# Patient Record
Sex: FEMALE | NOT HISPANIC OR LATINO | Employment: PART TIME | URBAN - METROPOLITAN AREA
[De-identification: names, ages, dates, MRNs, and addresses within clinical notes are randomized per-mention and may not be internally consistent; named-entity substitution may affect disease eponyms.]

---

## 2022-03-24 PROBLEM — G43.919 INTRACTABLE MIGRAINE WITHOUT STATUS MIGRAINOSUS: Status: ACTIVE | Noted: 2022-03-24

## 2022-05-19 ENCOUNTER — TELEPHONE (OUTPATIENT)
Dept: NEUROLOGY | Facility: MEDICAL CENTER | Age: 42
End: 2022-05-19

## 2022-05-19 ENCOUNTER — OFFICE VISIT (OUTPATIENT)
Dept: NEUROLOGY | Facility: MEDICAL CENTER | Age: 42
End: 2022-05-19
Attending: NURSE PRACTITIONER
Payer: COMMERCIAL

## 2022-05-19 VITALS
DIASTOLIC BLOOD PRESSURE: 68 MMHG | OXYGEN SATURATION: 97 % | TEMPERATURE: 97.7 F | HEIGHT: 63 IN | HEART RATE: 71 BPM | BODY MASS INDEX: 24.41 KG/M2 | SYSTOLIC BLOOD PRESSURE: 106 MMHG | WEIGHT: 137.8 LBS

## 2022-05-19 DIAGNOSIS — G43.919 INTRACTABLE MIGRAINE WITHOUT STATUS MIGRAINOSUS, UNSPECIFIED MIGRAINE TYPE: ICD-10-CM

## 2022-05-19 DIAGNOSIS — G43.109 COMPLICATED MIGRAINE: ICD-10-CM

## 2022-05-19 PROCEDURE — 99211 OFF/OP EST MAY X REQ PHY/QHP: CPT | Performed by: NURSE PRACTITIONER

## 2022-05-19 PROCEDURE — 99203 OFFICE O/P NEW LOW 30 MIN: CPT | Performed by: NURSE PRACTITIONER

## 2022-05-19 RX ORDER — DIVALPROEX SODIUM 500 MG/1
500 TABLET, DELAYED RELEASE ORAL
Qty: 30 TABLET | Refills: 2 | Status: SHIPPED | OUTPATIENT
Start: 2022-05-19 | End: 2022-08-31

## 2022-05-19 RX ORDER — ACETAMINOPHEN AND CODEINE PHOSPHATE 120; 12 MG/5ML; MG/5ML
1 SOLUTION ORAL
COMMUNITY
Start: 2022-03-06

## 2022-05-19 ASSESSMENT — PATIENT HEALTH QUESTIONNAIRE - PHQ9: CLINICAL INTERPRETATION OF PHQ2 SCORE: 0

## 2022-05-19 ASSESSMENT — ENCOUNTER SYMPTOMS
DEPRESSION: 1
SENSORY CHANGE: 0
DIZZINESS: 1
CONSTIPATION: 1
HEADACHES: 1
DIARRHEA: 0
NERVOUS/ANXIOUS: 1
TINGLING: 1
SINUS PAIN: 1
NECK PAIN: 1
PHOTOPHOBIA: 1
COUGH: 0

## 2022-05-19 ASSESSMENT — FIBROSIS 4 INDEX: FIB4 SCORE: 0.69

## 2022-05-19 NOTE — PATIENT INSTRUCTIONS
For prevention:   Ajovy 225mg subcutaneous injection every 4 weeks, this will first go through prior authorization department, you can find a copay card for this online.   Take out of the refrigerator AT LEAST 30 minutes prior to injection.   It will take 4-6 months to get the full effect of  this medication    For rescue:    Nurtec 75mg dissolvable tablet 1 up to once daily, up to 8 times per month, this too will go through prior authorization department, there is also a coupon card online.       I recommend the following over the counter supplements every night at bedtime:  Start magnesium oxide 400mg gel cap by mouth every night, may take extra dose if needed for headache (over the counter), hold for diarrhea         Start Riboflavin (Vitamin B2) 400mg by mouth every night (over the counter),may turn urine bright yellow         Start COQ 10, take 300mg every night. (over the counter)          Attempt to go to bed and get up at the same time every night           Eat meals on regular basis            Stay hydrated.             Aerobic exercise 30 minutes daily             Avoid aged or smoked foods, avoid processed foods, red wine, aged cheese              Keep headache diary, include foods that you may have eaten.             Avoid overusing over the counter medications:  Do not take more than 500mg acetaminophen (tylenol), more than 4 times weekly, more frequent or larger doses are associated with medication overuse headache.

## 2022-05-19 NOTE — TELEPHONE ENCOUNTER
Ajovy 225mg/1.5ml SOAJ    PA submitted via CM Key: [id number]  awaiting response TAT 24-72 hrs. - 05/19/2022 12:59pm

## 2022-05-19 NOTE — PROGRESS NOTES
Subjective   HPI  Rula Carbone is a 41 y.o.right handed female who presents with chronic migraine.    She was referred by PATEL Frost.    She has moved several times due to her work.  She is currently on Depakote, she is taking 1/2 of the 500mg Depakote daily, she is now getting a weird sensation in the top of her head, it is in one spot at the posterior crown of the head.  She takes a nap every afternoon, when she wakes up her hands are clenched.     Her very first migraine in her life was an ocular migraine at age 28.    She had been migraine free or some time and in July 2020, she got a migraine again that would not go away for 3 months, she was falling, she had weakness on the left side, she was exhausted, she was having difficulty with getting her words mixed up and she had vertigo.  She still gets this about twice per year, it slowly builds up for a week and then slowly ebbs for about 1 week.    Has had MRI In the past it is normal         Age at Onset:  28  Triggers: sunlight  Alleviating factors:  Laying down  Meds tried and result:         Preventative:  Medication Dose/length of treatment Result/side effects   Depakote 250-500mg helps   Topamax  Didn't work                                              Abortive:   Medication Dose/length of treatment Result/side effects   Steroids  Didn't help   Trigger point injections  Only helped for  Few days   Botox   9 months Helped, but then she moved   Rizatriptan  Didn't help   sumatriptan     naratriptan  Didn't work                    Hormones:  Oral birth control (she has had hysterectomy but is on OCP for ovarian cyst)  Caffeine use: drinks coffee all day  OTC medications--frequency: 1 tylenol or 1 ibuprofen 2-3 times per week  How many days per month:  30/30   Missed days of school/work in past 6 months:  2   Characteristics:                   A) Location:  Band above eyes and in the back of her head             B)Duration:  Can last up to 2 weeks.            "  C)Intensity:  5-6/10, without Depakote 8/10               D) Quality of pain:  Pressure,             N&V:  nausea  Photo/phonophobia:  Light   Aura: none   Prodrome:none  ER/Urgent care visits in past 6 months:  none  Sleep schedule:  Routine schedule, gets about 7 hours of sleep   Associated Symptoms:  Weakness, speech deficits, vertigo, exhaustion.             PMH: asthma, migraine, glaucoma.     Social Hx denies smoking or drug use, occasional glass of wine, she is a -she works part time.    Family Hx: Father: cancer HTN, Sister: ovarian cancer.    Review of Systems   HENT: Positive for congestion and sinus pain.    Eyes: Positive for photophobia.   Respiratory: Negative for cough.    Cardiovascular: Negative for chest pain.   Gastrointestinal: Positive for constipation. Negative for diarrhea.   Musculoskeletal: Positive for neck pain.   Neurological: Positive for dizziness, tingling and headaches. Negative for sensory change.   Psychiatric/Behavioral: Positive for depression. The patient is nervous/anxious.        Objective      Encounter Vitals  Standard Vitals  Vitals  Blood Pressure: 106/68  Temperature: 36.5 °C (97.7 °F)  Temp src: Temporal  Pulse: 71  Pulse Oximetry: 97 %  Height: 160 cm (5' 3\")  Weight: 62.5 kg (137 lb 12.8 oz)  Encounter Vitals  Temperature: 36.5 °C (97.7 °F)  Temp src: Temporal  Blood Pressure: 106/68  Pulse: 71  Pulse Oximetry: 97 %  Weight: 62.5 kg (137 lb 12.8 oz)  Height: 160 cm (5' 3\")  BMI (Calculated): 24.41    PHYSICAL ASSESSMENT  Constitutional:  Alert, no apparent distress,  Psych:   mood and affect WNL  Muskuloskeletal:  Moves all extremities equally, strength 5/5  BUE/BLE flexors/extensors, no drift  NEUROLOGICAL ASSESSMENT  Oriented X 4, speech fluent, naming and memory intact  CN II: Visual fields are full to confrontation. Fundoscopic exam is normal with sharp discs and no vascular changes. Pupils are 3 mm and briskly reactive to light.   CN III: IV, VI  EOMs " intact, no ptosis  CN V: Facial sensation is intact to pinprick in all 3 divisions bilaterally. Corneal responses are intact.  CN VII: Face is symmetric with normal eye closure and smile.  CN VIII Hearing is normal to rubbing fingers  CN IX, X: Palate elevates symmetrically. Phonation is normal.  CN XI: Head turning and shoulder shrug are intact  CN XII: Tongue is midline with normal movements and no atrophy.                           Sensation to PP equal bilaterally                 No limb ataxia with finger to nose and heel to shin                 Ambulates with steady gait.                 Rhomberg negative                Biceps,brachioradialis, tricep, and patellar reflexes all 2+     Cardiovascular:    S1S2, no abnormal rhythm auscultated, no peripheral edema  Neck:                     No carotid bruits noted   Pulmonary:            Respirations easy, lungs clear to auscultation all fields.     Skin:                     No obvious rashes.        Assessment & Plan      1. Complicated migraine    Continue Depakote 500mg daily    Ajovy 225mg subcutaneous injection every 4 weeks, this will first go through prior authorization department, you can find a copay card for this online.   Take out of the refrigerator AT LEAST 30 minutes prior to injection.t will take 4-6 months to get the full effect of  this medication    For rescue:    Nurtec 75mg dissolvable tablet 1 up to once daily, up to 8 times per month, this too will go through prior authorization department, there is also a coupon card online.        I recommend the following over the counter supplements every night at bedtime:  Start magnesium oxide 400mg gel cap by mouth every night, may take extra dose if needed for headache (over the counter), hold for diarrhea         Start Riboflavin (Vitamin B2) 400mg by mouth every night (over the counter),may turn urine bright yellow         Start COQ 10, take 300mg every night. (over the counter)          Attempt to  go to bed and get up at the same time every night           Eat meals on regular basis            Stay hydrated.             Aerobic exercise 30 minutes daily             Avoid aged or smoked foods, avoid processed foods, red wine, aged cheese              Keep headache diary, include foods that you may have eaten.             Avoid overusing over the counter medications:  Do not take more than 500mg acetaminophen (tylenol), more than 4 times weekly, more frequent or larger doses are associated with medication overuse headache.          I counseled patient on migraine triggers, lifestyle changes, medication overuse, supplements and medication side effects.      Follow up in 3 months

## 2022-08-22 DIAGNOSIS — G43.919 INTRACTABLE MIGRAINE WITHOUT STATUS MIGRAINOSUS, UNSPECIFIED MIGRAINE TYPE: ICD-10-CM

## 2022-08-25 ENCOUNTER — TELEPHONE (OUTPATIENT)
Dept: NEUROLOGY | Facility: MEDICAL CENTER | Age: 42
End: 2022-08-25

## 2022-08-25 ENCOUNTER — OFFICE VISIT (OUTPATIENT)
Dept: NEUROLOGY | Facility: MEDICAL CENTER | Age: 42
End: 2022-08-25
Attending: NURSE PRACTITIONER
Payer: COMMERCIAL

## 2022-08-25 VITALS
HEART RATE: 76 BPM | OXYGEN SATURATION: 100 % | HEIGHT: 63 IN | BODY MASS INDEX: 24.3 KG/M2 | RESPIRATION RATE: 12 BRPM | WEIGHT: 137.13 LBS | DIASTOLIC BLOOD PRESSURE: 60 MMHG | TEMPERATURE: 97.4 F | SYSTOLIC BLOOD PRESSURE: 96 MMHG

## 2022-08-25 DIAGNOSIS — R00.2 PALPITATIONS: ICD-10-CM

## 2022-08-25 DIAGNOSIS — R42 DIZZINESS: ICD-10-CM

## 2022-08-25 DIAGNOSIS — R42 POSTURAL DIZZINESS WITH PRESYNCOPE: ICD-10-CM

## 2022-08-25 DIAGNOSIS — R55 POSTURAL DIZZINESS WITH PRESYNCOPE: ICD-10-CM

## 2022-08-25 DIAGNOSIS — G43.919 INTRACTABLE MIGRAINE WITHOUT STATUS MIGRAINOSUS, UNSPECIFIED MIGRAINE TYPE: ICD-10-CM

## 2022-08-25 PROBLEM — G43.109 COMPLICATED MIGRAINE: Status: ACTIVE | Noted: 2022-08-25

## 2022-08-25 PROCEDURE — 99211 OFF/OP EST MAY X REQ PHY/QHP: CPT | Performed by: NURSE PRACTITIONER

## 2022-08-25 PROCEDURE — 99214 OFFICE O/P EST MOD 30 MIN: CPT | Performed by: NURSE PRACTITIONER

## 2022-08-25 ASSESSMENT — ENCOUNTER SYMPTOMS
SINUS PAIN: 1
SHORTNESS OF BREATH: 1
COUGH: 1
DIZZINESS: 1
CONSTIPATION: 1
NERVOUS/ANXIOUS: 0
BLURRED VISION: 1
WEIGHT LOSS: 0
FALLS: 0
DOUBLE VISION: 0
HEADACHES: 1
DIARRHEA: 1
PALPITATIONS: 1
PHOTOPHOBIA: 1
NECK PAIN: 1
FEVER: 1
DEPRESSION: 0

## 2022-08-25 ASSESSMENT — FIBROSIS 4 INDEX: FIB4 SCORE: 0.69

## 2022-08-25 NOTE — PROGRESS NOTES
Subjective     HPI  Rula Carbone is a 41 y.o.right handed female who presents  for follow up for chronic migraine.    I last saw her on 5/19/2022    She has moved several times due to her work.  She is currently on Depakote, she is taking 1/2 of the 500mg Depakote daily, she is now getting a weird sensation in the top of her head, it is in one spot at the posterior crown of the head.  She takes a nap every afternoon, when she wakes up her hands are clenched.      Her very first migraine in her life was an ocular migraine at age 28.     She had been migraine free or some time and in July 2020, she got a migraine again that would not go away for 3 months, she was falling, she had weakness on the left side, she was exhausted, she was having difficulty with getting her words mixed up and she had vertigo.  She still gets this about twice per year, it slowly builds up for a week and then slowly ebbs for about 1 week.     Has had MRI In the past it is normal    At last visit, we started Ajovy, it has decreased her migraines to once per month (last for about 5-7 days, when Ajovy is due).       She is getting some constipation and sometime diarrhea, her lower abdomen hurts when she is urinating or trying to have a bowel movement.     When she bends down to pick something up, she gets lightheaded and her heart flutters, she feels dizzy like she is going to pass out.  She feels that she is probably not drinking enough water.               Age at Onset:  28  Triggers: sunlight  Alleviating factors:  Laying down  Meds tried and result:          Preventative:  Medication Dose/length of treatment Result/side effects   Depakote 250-500mg helps   Topamax   Didn't work    Ajovy  225mg  decreased migraine days from 30/30 to 5-7/30                                                            Abortive:   Medication Dose/length of treatment Result/side effects   Steroids   Didn't help   Trigger point injections   Only helped for  Few days    Botox    9 months Helped, but then she moved   Rizatriptan   Didn't help   sumatriptan       naratriptan   Didn't work                              Hormones:  Oral birth control (she has had hysterectomy but is on OCP for ovarian cyst)  Caffeine use: drinks coffee all day  OTC medications--frequency: 1 tylenol or 1 ibuprofen 2-3 times per week  How many days per month:  5-7/30   Missed days of school/work in past 6 months:  2   Characteristics:                   A) Location:  Band above eyes and in the back of her head             B)Duration:  Can last up to 2 weeks.             C)Intensity:  5-6/10, without Depakote 8/10               D) Quality of pain:  Pressure,             N&V:  nausea  Photo/phonophobia:  Light   Aura: none   Prodrome:none  ER/Urgent care visits in past 6 months:  none  Sleep schedule:  Routine schedule, gets about 7 hours of sleep   Associated Symptoms:  Weakness, speech deficits, vertigo, exhaustion.               PMH: asthma, migraine, glaucoma.      Social Hx denies smoking or drug use, occasional glass of wine, she is a -she works part time.     Family Hx: Father: cancer HTN, Sister: ovarian cancer.     Review of Systems   Constitutional:  Positive for fever and malaise/fatigue. Negative for weight loss.   HENT:  Positive for congestion and sinus pain.    Eyes:  Positive for blurred vision and photophobia. Negative for double vision.   Respiratory:  Positive for cough and shortness of breath.    Cardiovascular:  Positive for palpitations. Negative for chest pain.   Gastrointestinal:  Positive for constipation and diarrhea.   Genitourinary:  Positive for dysuria.   Musculoskeletal:  Positive for neck pain. Negative for falls.   Neurological:  Positive for dizziness and headaches.   Psychiatric/Behavioral:  Negative for depression. The patient is not nervous/anxious.             Objective     BP (!) 96/60 (BP Location: Right arm, Patient Position: Sitting, BP Cuff Size: Adult)    "Pulse 76   Temp 36.3 °C (97.4 °F) (Temporal)   Resp 12   Ht 1.6 m (5' 3\")   Wt 62.2 kg (137 lb 2 oz)   SpO2 100%   BMI 24.29 kg/m²        PHYSICAL EXAM  Constitutional:  Alert, no apparent distress,  Psych:   mood and affect WNL     Neuro:  Oriented X 4, speech fluent, naming and memory intact          Muskuloskeletal:  Moves all extremities equally, strength 5/5 bilaterally,          CN II: . Fundoscopic exam is normal with sharp discs and no vascular changes. Pupils are 4 mm and briskly reactive to light.          CN III, IV, VI  EOMs intact, no ptosis         CN V: Corneal responses are intact.         CN VII: Face is symmetric with normal eye closure and smile.         CN IX, X: Palate elevates symmetrically. Phonation is normal.         CN XI: Head turning and shoulder shrug are intact         CN XII: Tongue is midline with normal movements and no atrophy.                                       Ambulates with steady gait.                              Cardiovascular:    S1S2, no abnormal rhythm auscultated, no peripheral edema  Neck:                     supple   Pulmonary:            Respirations easy, lungs clear to auscultation all fields.     Skin:                     No obvious rashes.           Assessment & Plan       1. Intractable migraine without status migrainosus, unspecified migraine type         Continue Depakote 500mg daily, will check Depakote level, last sodium 142.      Ajovy 225mg subcutaneous injection every 4 weeks, this will first go through prior authorization department, you can find a copay card for this online.   Take out of the refrigerator AT LEAST 30 minutes prior to injection.t will take 4-6 months to get the full effect of  this medication     For rescue:     Nurtec 75mg dissolvable tablet 1 up to once daily, up to 8 times per month, this too will go through prior authorization department, there is also a coupon card online.          I recommend the following over the counter " supplements every night at bedtime:  Start magnesium oxide 400mg gel cap by mouth every night, may take extra dose if needed for headache (over the counter), hold for diarrhea         Start Riboflavin (Vitamin B2) 400mg by mouth every night (over the counter),may turn urine bright yellow         Start COQ 10, take 300mg every night. (over the counter)          Attempt to go to bed and get up at the same time every night           Eat meals on regular basis            Stay hydrated.             Aerobic exercise 30 minutes daily             Avoid aged or smoked foods, avoid processed foods, red wine, aged cheese              Keep headache diary, include foods that you may have eaten.             Avoid overusing over the counter medications:  Do not take more than 500mg acetaminophen (tylenol), more than 4 times weekly, more frequent or larger doses are associated with medication overuse headache.       2 Dizziness    Increase water to at least 65 ounces, avoid caffeine.      Referred for 30 day heart monitor.     Will check BMP (she is on Depakote)    3 Postural dizziness with presyncope  See # 3    4. Palpitations  See# 3.         I counseled patient on migraine triggers, lifestyle changes, medication overuse, supplements and medication side effects.        Follow up in 3 months       Will check orthostatics at next visit, unable to do today due to time constraints.

## 2022-08-25 NOTE — TELEPHONE ENCOUNTER
NURTEC 75mg Dispersible Tablet    PA submitted via CMM  (Key: [id number]) awaiting response TAT 24-72 hrs. - 08/25/2022 11:47am

## 2022-08-25 NOTE — PATIENT INSTRUCTIONS
Increase water to at least 65 ounces, avoid caffeine.     Cardiology will contact you about heart monitor.       I recommend the following over the counter supplements every night at bedtime:  Start magnesium oxide 400mg gel cap by mouth every night, may take extra dose if needed for headache (over the counter), hold for diarrhea         Start Riboflavin (Vitamin B2) 400mg by mouth every night (over the counter),may turn urine bright yellow         Start COQ 10, take 300mg every night. (over the counter)          Attempt to go to bed and get up at the same time every night           Eat meals on regular basis            Stay hydrated.             Aerobic exercise 30 minutes daily             Avoid aged or smoked foods, avoid processed foods, red wine, aged cheese              Keep headache diary, include foods that you may have eaten.             Avoid overusing over the counter medications:  Do not take more than 500mg acetaminophen (tylenol), more than 4 times weekly, more frequent or larger doses are associated with medication overuse headache.

## 2022-08-29 ENCOUNTER — TELEPHONE (OUTPATIENT)
Dept: NEUROLOGY | Facility: MEDICAL CENTER | Age: 42
End: 2022-08-29
Payer: COMMERCIAL

## 2022-08-29 NOTE — TELEPHONE ENCOUNTER
Contacted patient at [phone] to discuss [facility] and services/benefits offered. No answer, left voicemail.    Tiny Cox  Rx Coordinator   [phone]

## 2022-08-31 RX ORDER — DIVALPROEX SODIUM 500 MG/1
TABLET, DELAYED RELEASE ORAL
Qty: 30 TABLET | Refills: 5 | Status: SHIPPED | OUTPATIENT
Start: 2022-08-31 | End: 2022-12-14 | Stop reason: SDUPTHER

## 2022-09-09 ENCOUNTER — NON-PROVIDER VISIT (OUTPATIENT)
Dept: CARDIOLOGY | Facility: MEDICAL CENTER | Age: 42
End: 2022-09-09
Attending: NURSE PRACTITIONER
Payer: COMMERCIAL

## 2022-09-09 DIAGNOSIS — R42 POSTURAL DIZZINESS WITH PRESYNCOPE: ICD-10-CM

## 2022-09-09 DIAGNOSIS — R42 DIZZINESS: ICD-10-CM

## 2022-09-09 DIAGNOSIS — I49.3 PVC'S (PREMATURE VENTRICULAR CONTRACTIONS): ICD-10-CM

## 2022-09-09 DIAGNOSIS — I49.1 APC (ATRIAL PREMATURE CONTRACTIONS): ICD-10-CM

## 2022-09-09 DIAGNOSIS — R55 POSTURAL DIZZINESS WITH PRESYNCOPE: ICD-10-CM

## 2022-09-09 DIAGNOSIS — R00.2 PALPITATIONS: ICD-10-CM

## 2022-09-09 NOTE — PROGRESS NOTES
Patient enrolled in the 30 day [facility]  heart monitoring program, per Kimi Mccarthy M.D.  >In clinic hook up, monitor S/N [id number].  >Baseline Transmitted.  >Pending EOS.

## 2022-09-13 ENCOUNTER — HOSPITAL ENCOUNTER (OUTPATIENT)
Dept: LAB | Facility: MEDICAL CENTER | Age: 42
End: 2022-09-13
Attending: NURSE PRACTITIONER
Payer: COMMERCIAL

## 2022-09-13 DIAGNOSIS — R42 DIZZINESS: ICD-10-CM

## 2022-09-13 LAB
ANION GAP SERPL CALC-SCNC: 11 MMOL/L (ref 7–16)
BUN SERPL-MCNC: 11 MG/DL (ref 8–22)
CALCIUM SERPL-MCNC: 9.4 MG/DL (ref 8.5–10.5)
CHLORIDE SERPL-SCNC: 102 MMOL/L (ref 96–112)
CO2 SERPL-SCNC: 25 MMOL/L (ref 20–33)
CREAT SERPL-MCNC: 0.94 MG/DL (ref 0.5–1.4)
GFR SERPLBLD CREATININE-BSD FMLA CKD-EPI: 79 ML/MIN/1.73 M 2
GLUCOSE SERPL-MCNC: 104 MG/DL (ref 65–99)
POTASSIUM SERPL-SCNC: 3.7 MMOL/L (ref 3.6–5.5)
SODIUM SERPL-SCNC: 138 MMOL/L (ref 135–145)

## 2022-09-13 PROCEDURE — 80048 BASIC METABOLIC PNL TOTAL CA: CPT

## 2022-09-13 PROCEDURE — 36415 COLL VENOUS BLD VENIPUNCTURE: CPT

## 2022-10-11 ENCOUNTER — TELEPHONE (OUTPATIENT)
Dept: CARDIOLOGY | Facility: MEDICAL CENTER | Age: 42
End: 2022-10-11
Payer: COMMERCIAL

## 2022-10-11 PROCEDURE — 93268 ECG RECORD/REVIEW: CPT | Performed by: INTERNAL MEDICINE

## 2022-11-22 ENCOUNTER — TELEPHONE (OUTPATIENT)
Dept: NEUROLOGY | Facility: MEDICAL CENTER | Age: 42
End: 2022-11-22
Payer: COMMERCIAL

## 2022-11-22 NOTE — TELEPHONE ENCOUNTER
Established Patient     [facility] Patient is checked in Patient Demographics? Yes    Is visit type and length correct?  Yes    Is referral attached to visit? Yes    Were records received from referring provider? Yes    Patient was not contacted to have someone accompany them to visit?    Is this appointment scheduled as a Hospital Follow-Up?  No    Does the patient require any pre procedure or post procedure follow up? No    If any orders were placed at last visit or intended to be done for this visit do we have Results/Consult Notes? Yes  Labs - Labs ordered, completed on 09/13/22 and results are in chart.  Imaging - Imaging was not ordered at last office visit.  Referrals - Referral was placed for 30 day heart monitor and results came out on 10/11/22.        10.  If patient appointment is for Botox - is order pended for provider? No

## 2022-12-07 ENCOUNTER — TELEPHONE (OUTPATIENT)
Dept: NEUROLOGY | Facility: MEDICAL CENTER | Age: 42
End: 2022-12-07
Payer: COMMERCIAL

## 2022-12-07 NOTE — TELEPHONE ENCOUNTER
NEUROLOGY PATIENT PRE-VISIT PLANNING     Patient was NOT contacted to complete PVP.  Note: Patient will not be contacted if there is no indication to call.     Patient Appointment is scheduled as: Established Patient     Is visit type and length scheduled correctly? Yes    [facility] Patient [facility]? Yes    3.   Is referral attached to visit? Yes    4. Were records received from referring provider? Yes    4. Patient was NOT contacted to have someone accompany them to visit.     5. Is this appointment scheduled as a Hospital Follow-Up?  No    6. Does the patient require any pre procedure or post procedure follow up? No    7. If any orders were placed at last visit or intended to be done for this visit do we have Results/Consult Notes? Yes  Labs - Labs ordered, completed on 09/13/22 and results are in chart.  Imaging - Imaging was not ordered at last office visit.  Referrals -A referral was put in for a 30 day heart monitor.   Note: If patient appointment is for lab or imaging review and patient did not complete the studies, check with provider if OK to reschedule patient until completed.    8. If patient appointment is for Botox - is order pended for provider? N/A

## 2022-12-14 ENCOUNTER — OFFICE VISIT (OUTPATIENT)
Dept: NEUROLOGY | Facility: MEDICAL CENTER | Age: 42
End: 2022-12-14
Attending: NURSE PRACTITIONER
Payer: COMMERCIAL

## 2022-12-14 VITALS
BODY MASS INDEX: 24.99 KG/M2 | HEART RATE: 76 BPM | DIASTOLIC BLOOD PRESSURE: 70 MMHG | OXYGEN SATURATION: 99 % | WEIGHT: 141.09 LBS | TEMPERATURE: 97.2 F | SYSTOLIC BLOOD PRESSURE: 112 MMHG

## 2022-12-14 DIAGNOSIS — G43.919 INTRACTABLE MIGRAINE WITHOUT STATUS MIGRAINOSUS, UNSPECIFIED MIGRAINE TYPE: ICD-10-CM

## 2022-12-14 DIAGNOSIS — G43.109 COMPLICATED MIGRAINE: ICD-10-CM

## 2022-12-14 PROCEDURE — 99213 OFFICE O/P EST LOW 20 MIN: CPT | Performed by: NURSE PRACTITIONER

## 2022-12-14 PROCEDURE — 99212 OFFICE O/P EST SF 10 MIN: CPT | Performed by: NURSE PRACTITIONER

## 2022-12-14 RX ORDER — DIVALPROEX SODIUM 500 MG/1
500 TABLET, DELAYED RELEASE ORAL
Qty: 30 TABLET | Refills: 11 | Status: SHIPPED | OUTPATIENT
Start: 2022-12-14 | End: 2023-06-14

## 2022-12-14 ASSESSMENT — ENCOUNTER SYMPTOMS
NERVOUS/ANXIOUS: 0
SINUS PAIN: 1
BLURRED VISION: 0
CONSTIPATION: 0
VOMITING: 0
DOUBLE VISION: 0
HEADACHES: 1
SHORTNESS OF BREATH: 1
DEPRESSION: 0
COUGH: 0
DIZZINESS: 1
FALLS: 0
PALPITATIONS: 0
NAUSEA: 0

## 2022-12-14 ASSESSMENT — FIBROSIS 4 INDEX: FIB4 SCORE: 0.71

## 2022-12-14 NOTE — PATIENT INSTRUCTIONS
STOP Nurtec, it  doesn't work    Start Ubrelvy 100mg, up to twice per, no more than 16 per month (will go through a prior authorization)     Continue Ajovy 225mg every 4 weeks    Continue Depakote 500mg every day     I recommend the following over the counter supplements every night at bedtime:  Start magnesium oxide 400mg gel cap by mouth every night, may take extra dose if needed for headache (over the counter), hold for diarrhea         Start Riboflavin (Vitamin B2) 400mg by mouth every night (over the counter),may turn urine bright yellow         Start COQ 10, take 300mg every night. (over the counter)          Attempt to go to bed and get up at the same time every night           Eat meals on regular basis            Stay hydrated.             Aerobic exercise 30 minutes daily             Avoid aged or smoked foods, avoid processed foods, red wine, aged cheese              Keep headache diary, include foods that you may have eaten.             Avoid overusing over the counter medications:  Do not take more than 500mg acetaminophen (tylenol), more than 4 times weekly, more frequent or larger doses are associated with medication overuse headache.

## 2022-12-14 NOTE — PROGRESS NOTES
Subjective         HPI    Rula Carbone is a 42 y.o.right handed female who presents  for follow up for chronic migraine.     I last saw her on 8/25/2022    She continues on Depakote 500mg daily, no current side effects.    We started Ajovy in 5/2022, her migraines are now down to once a month, they are accompanied by difficulty finding words and vertigo.  They last for 4-5 days. We gave her Nurtec, it didn't help.          Has had MRI In the past it is normal        She is getting some constipation and sometime diarrhea, her lower abdomen hurts when she is urinating or trying to have a bowel movement.      When she bends down to pick something up, she gets lightheaded , at one point she would have heart fluttering with this, she no longer has the heart fluttering but does get lightheaded.She drinks at least 68 ounces of water daily.               Age at Onset:  28  Triggers: sunlight  Alleviating factors:  Laying down  Meds tried and result:          Preventative:  Medication Dose/length of treatment Result/side effects   Depakote 250-500mg helps   Topamax   Didn't work    Ajovy  225mg  decreased migraine days from 30/30 to 3-4 (1 migraine lasting 3-4 days)    Botox 9 months Helped then she moved.                                                    Abortive:   Medication Dose/length of treatment Result/side effects   Steroids   Didn't help   Trigger point injections   Only helped for  Few days        Rizatriptan   Didn't help   sumatriptan       naratriptan   Didn't work    Nurtec   Doesn't help                      Hormones:  Oral birth control (she has had hysterectomy but is on OCP for ovarian cyst)  Caffeine use: drinks coffee all day  OTC medications--frequency: 1 tylenol or 1 ibuprofen 2-3 times per week  How many days per month:  3-4/30   Missed days of school/work in past 6 months:  2   Characteristics:                   A) Location:  Band above eyes and in the back of her head             B)Duration:  Can  last up to 2 weeks.             C)Intensity:  5-6/10, without Depakote 8/10               D) Quality of pain:  Pressure,             N&V:  nausea  Photo/phonophobia:  Light   Aura: none   Prodrome:none  ER/Urgent care visits in past 6 months:  none  Sleep schedule:  Routine schedule, gets about 7 hours of sleep   Associated Symptoms:  Weakness, speech deficits, vertigo, exhaustion.           Review of Systems   HENT:  Positive for congestion and sinus pain.    Eyes:  Negative for blurred vision and double vision.   Respiratory:  Positive for shortness of breath. Negative for cough.    Cardiovascular:  Negative for chest pain and palpitations.   Gastrointestinal:  Negative for constipation, nausea and vomiting.   Musculoskeletal:  Negative for falls.   Neurological:  Positive for dizziness and headaches.   Psychiatric/Behavioral:  Negative for depression. The patient is not nervous/anxious.         Current Outpatient Medications on File Prior to Visit   Medication Sig Dispense Refill    divalproex (DEPAKOTE) 500 MG Tablet Delayed Response TAKE 1 TABLET BY MOUTH EVERY DAY 30 Tablet 5    Rimegepant Sulfate 75 MG TABLET DISPERSIBLE Take 1 Tablet by mouth 1 time a day as needed (migraine). 8 Tablet 11    norethindrone (MICRONOR) 0.35 MG tablet Take 1 Tablet by mouth every day.      Fremanezumab-vfrm 225 MG/1.5ML Solution Auto-injector Inject 225 mg under the skin every 4 weeks. 1.5 mL 12    Naproxen Sodium 220 MG Cap Take  by mouth.      ibuprofen (MOTRIN) 200 MG Tab Take 200 mg by mouth every 6 hours as needed.       No current facility-administered medications on file prior to visit.     Past Medical History:   Diagnosis Date    Allergy     Asthma     Glaucoma     Head ache     Migraine         Social History     Socioeconomic History    Marital status:      Spouse name: Not on file    Number of children: Not on file    Years of education: Not on file    Highest education level: Not on file   Occupational  History    Not on file   Tobacco Use    Smoking status: Never    Smokeless tobacco: Never   Vaping Use    Vaping Use: Never used   Substance and Sexual Activity    Alcohol use: Not Currently     Comment: occ    Drug use: Never    Sexual activity: Not on file   Other Topics Concern    Not on file   Social History Narrative    Not on file     Social Determinants of Health     Financial Resource Strain: Not on file   Food Insecurity: Not on file   Transportation Needs: Not on file   Physical Activity: Not on file   Stress: Not on file   Social Connections: Not on file   Intimate Partner Violence: Not on file   Housing Stability: Not on file         Objective     /70 (BP Location: Left arm, Patient Position: Sitting, BP Cuff Size: Adult)   Pulse 76   Temp 36.2 °C (97.2 °F) (Temporal)   Wt 64 kg (141 lb 1.5 oz)   SpO2 99%   BMI 24.99 kg/m²        PHYSICAL EXAM  Constitutional:  Alert, no apparent distress,  Psych:   mood and affect WNL     Neuro:  Oriented X 4, speech fluent, naming and memory intact          Muskuloskeletal:  Moves all extremities equally, strength 5/5 bilaterally,          CN II: . Fundoscopic exam is normal with sharp discs and no vascular changes. Pupils are 4 mm and briskly reactive to light.          CN III, IV, VI  EOMs intact, no ptosis         CN V: Corneal responses are intact.         CN VII: Face is symmetric with normal eye closure and smile.         CN IX, X: Palate elevates symmetrically. Phonation is normal.         CN XI: Head turning and shoulder shrug are intact         CN XII: Tongue is midline with normal movements and no atrophy.                                       Ambulates with steady gait.                              Cardiovascular:    S1S2, no abnormal rhythm auscultated, no peripheral edema  Neck:                     Supple  Pulmonary:            Respirations easy, lungs clear to auscultation all fields.     Skin:                     No obvious rashes.           Assessment & Plan        1. Intractable migraine without status migrainosus, unspecified migraine type          Continue Depakote 500mg daily,      Continue Ajovy 225mg subcutaneous injection every 4 weeks,        For rescue:    Stop Nurtec, it  doesn't work    Start Ubrelvy 100mg, up to twice per, no more than 16 per month            I recommend the following over the counter supplements every night at bedtime:  Start magnesium oxide 400mg gel cap by mouth every night, may take extra dose if needed for headache (over the counter), hold for diarrhea         Start Riboflavin (Vitamin B2) 400mg by mouth every night (over the counter),may turn urine bright yellow         Start COQ 10, take 300mg every night. (over the counter)          Attempt to go to bed and get up at the same time every night           Eat meals on regular basis            Stay hydrated.             Aerobic exercise 30 minutes daily             Avoid aged or smoked foods, avoid processed foods, red wine, aged cheese              Keep headache diary, include foods that you may have eaten.             Avoid overusing over the counter medications:  Do not take more than 500mg acetaminophen (tylenol), more than 4 times weekly, more frequent or larger doses are associated with medication overuse headache.        2 Dizziness    Increase water to at least 65 ounces, avoid caffeine.       cardiac event monitor normal.    I spent 22 minutes caring for patient, my time includes review of systems, review of chart , assessment and counseling.    I counseled patient on migraine triggers, lifestyle changes, medication overuse, supplements and medication side effects.        Follow up in 6 months

## 2022-12-20 ENCOUNTER — TELEPHONE (OUTPATIENT)
Dept: NEUROLOGY | Facility: MEDICAL CENTER | Age: 42
End: 2022-12-20

## 2023-06-14 ENCOUNTER — OFFICE VISIT (OUTPATIENT)
Dept: NEUROLOGY | Facility: MEDICAL CENTER | Age: 43
End: 2023-06-14
Attending: PSYCHIATRY & NEUROLOGY
Payer: COMMERCIAL

## 2023-06-14 VITALS
OXYGEN SATURATION: 100 % | DIASTOLIC BLOOD PRESSURE: 72 MMHG | HEART RATE: 75 BPM | SYSTOLIC BLOOD PRESSURE: 118 MMHG | HEIGHT: 63 IN | WEIGHT: 138.23 LBS | BODY MASS INDEX: 24.49 KG/M2 | TEMPERATURE: 98.8 F

## 2023-06-14 DIAGNOSIS — G43.E09 CHRONIC MIGRAINE WITH AURA: Primary | ICD-10-CM

## 2023-06-14 DIAGNOSIS — G43.109 COMPLICATED MIGRAINE: ICD-10-CM

## 2023-06-14 PROCEDURE — 3074F SYST BP LT 130 MM HG: CPT | Performed by: PSYCHIATRY & NEUROLOGY

## 2023-06-14 PROCEDURE — 3078F DIAST BP <80 MM HG: CPT | Performed by: PSYCHIATRY & NEUROLOGY

## 2023-06-14 PROCEDURE — 99211 OFF/OP EST MAY X REQ PHY/QHP: CPT | Performed by: PSYCHIATRY & NEUROLOGY

## 2023-06-14 PROCEDURE — 99215 OFFICE O/P EST HI 40 MIN: CPT | Performed by: PSYCHIATRY & NEUROLOGY

## 2023-06-14 RX ORDER — VALPROIC ACID 250 MG/1
250 CAPSULE, LIQUID FILLED ORAL DAILY
Qty: 14 CAPSULE | Refills: 0 | Status: SHIPPED | OUTPATIENT
Start: 2023-06-14 | End: 2023-06-28

## 2023-06-14 ASSESSMENT — FIBROSIS 4 INDEX: FIB4 SCORE: 0.71

## 2023-06-14 NOTE — PROGRESS NOTES
"[facility] NEUROLOGY  GENERAL NEUROLOGY  FOLLOW-UP VISIT    CC: migraine    INTERVAL HISTORY:  Rula Carbone is a 42 y.o. woman with migraine.  She last saw Kimi Mccarthy in the clinic on 12/14/2022.  At that time they planned to continue valproic acid 500 mg daily as well as Ajovy.  Today, she was unaccompanied, and she provided the following interval history:    The following is a summary of headache symptoms, presented in my standard format:    Family History: father, daughter  Age at onset: 20s  Location: holocephalic  Radiation: n/a  Frequency: baseline: constant, lately: 1/month  Duration: baseline: 1 week  Headache Days/Month: baseline: 25/30, on Ajovy: 3/30  Quality: tightening, pressure  Intensity: baseline: 10/10, lately: 5/10  Aura: visual (lost vision in left eye), motor (weakness), language (difficulty \"speaking\"), fatigue  Photophobia/Phonophobia/Nausea/Vomiting: yes/yes/yes/no  Provoked by Physical Activity?:   Triggers: sleep deprivation, fluorescent lights, flashing lights  Associated Symptoms: vertigo, double vision  Autonomic Signs (such as ptosis, miosis, conjunctival injection, rhinorrhea, increased lacrimation):   Head Trauma:   Association with Menses: s/p hysterectomy in 2017  ED Visits: none  Hospitalizations: none  Missed Work Days (): now while on Ajovy, yes in the past  Sleep (hours/night): 10  Caffeine Intake: 4 cups/day  Hydration: not as much the last few weeks  Nutrition: eats regularly  Exercise:   Analgesic Overuse:     Current Medication Regimen:  - valproic acid 500 mg daily: helpful  - Ajovy: very effective, prohibitively expensive    Medications Tried: Response  Preventive:  - magnesium, coenzyme Q10  - topiramate: ineffective  - nortriptyline: ineffective  - Botox: helpful, then moved to [place]    Rescue:  - rizatriptan: ineffective  - sumatriptan: doesn't recall response  - naratriptan: ineffective  - Nurtec: ineffective  - Ubrelvy: ineffective    Medications Not Tried:  - " propranolol: contraindicated due to baseline low blood pressure    MEDICATIONS:  Current Outpatient Medications   Medication Sig    Fremanezumab-vfrm 225 MG/1.5ML Solution Auto-injector Inject 225 mg under the skin every 4 weeks.    valproic acid (DEPAKENE) 250 MG Cap Take 1 Capsule by mouth every day for 14 days.    norethindrone (MICRONOR) 0.35 MG tablet Take 1 Tablet by mouth every day.    Ubrogepant 100 MG Tab Take 100 mg by mouth 2 times a day as needed (migraine).    Naproxen Sodium 220 MG Cap Take  by mouth.    ibuprofen (MOTRIN) 200 MG Tab Take 200 mg by mouth every 6 hours as needed.     MEDICAL, SOCIAL, AND FAMILY HISTORY:  There is no change in the patient's ROS or medical, social, or family histories since the previous visit on 12/14/2022.    REVIEW OF SYSTEMS:  A ROS was completed.  Pertinent positives and negatives were included in the HPI, above.  All other systems were reviewed and are negative.    PHYSICAL EXAM:  General/Medical:  - NAD    Neuro:  MENTAL STATUS: awake and alert; no deficits of speech or language; oriented to conversation; affect was appropriate to situation; pleasant, cooperative    CRANIAL NERVES:    II: acuity: NT, fields: NT, pupils: NT, discs: sharp    III/IV/VI: versions: grossly intact    V: facial sensation: NT    VII: facial expression: symmetric    VIII: hearing: intact to voice    IX/X: palate: NT    XI: shoulder shrug: NT    XII: tongue: NT    MOTOR:  - bulk: NT  - tone: NT  Upper Extremity Strength (R/L)    NT   Elbow flexion NT   Elbow extension NT   Shoulder abduction NT     Lower Extremity Strength (R/L)   Hip flexion NT   Knee extension NT   Knee flexion NT   Ankle plantarflexion NT   Ankle dorsiflexion NT     - pronator drift: NT  - abnormal movements: none    SENSATION:  - light touch: NT  - vibration (R/L, seconds): NT at the great toes  - pinprick: NT  - proprioception: NT  - Romberg: NT    COORDINATION:  - finger to nose: NT  - finger tapping:  "NT    REFLEXES:  Reflex Right Left   BR NT NT   Biceps NT NT   Triceps NT NT   Patellae NT NT   Achilles NT NT   Toes NT NT     GAIT:  - NT    REVIEW OF IMAGING STUDIES:  No data available.  MRI brain performed in [place] reportedly normal    REVIEW OF LABORATORY STUDIES:  No recent data available.    ASSESSMENT:  Rula Carbone is a 42 y.o. woman with chronic migraine with aura.      PLAN:  Chronic Migraine w/ Aura:  Prevention:  - resume Ajovy  - taper off valproic acid over ~2 weeks  - try supplementing:  - riboflavin (vitamin B2): 400 mg once daily  - get 7-9 hours of sleep per night; can try supplementing melatonin 2-10 mg, 2-3 hours before bedtime  - drink plenty of fluids (urine should be nearly clear)  - avoid excessive caffeine intake (no more than 2 servings per day and nothing in the afternoon)  - eat regular meals (don't skip meals)  - get moderate exercise (even just a 20 minute walk daily)    Rescue:  - continue Ubrelvy  - do not use analgesics (e.g., ibuprofen, acetaminophen) more than 2 days per week in order to avoid analgesic rebound headaches    - keep a headache log    Follow-Up:  - Return in about 6 months (around 12/14/2023).    Signed: Marck Felix M.D.    BILLING DOCUMENTATION:   I spent 41 minutes reviewing the medical record, interviewing and examining the patient, discussing my impression (see \"assessment\" above), and coordinating care.  "

## 2023-12-06 ENCOUNTER — TELEPHONE (OUTPATIENT)
Dept: PHARMACY | Facility: MEDICAL CENTER | Age: 43
End: 2023-12-06
Payer: COMMERCIAL

## 2023-12-06 NOTE — TELEPHONE ENCOUNTER
Fremanezumab-vfrm 225 MG/1.5ML Solution Auto-injector    PA Renewal    Initiated pa in cmm   (Key: [id number])    Per CMM: Available without authorization    [facility] [employer] at [phone] to see if Renewal is needed. spoke to Rosette. stated it needs A PA renewal and can do via Phone. initiated PA via phone. answered clinical questions.  PA approved 12/06/2023 - 12/05/2024 PA .Ref # [id number]    Prior Authorization for Ajovy has been approved for a quantity of 1.5ml , day supply 30    Prior Authorization reference number: [id number]  Insurance: [employer]  Effective dates: 12/06/2023 - 12/05/2024  Copay: $700.31     Is patient eligible to fill with [facility]? Yes    Next Steps: The patient's copay exceeds $5.00. Proceed with contacting patient to offer financial assistance.